# Patient Record
Sex: FEMALE | Race: WHITE | NOT HISPANIC OR LATINO
[De-identification: names, ages, dates, MRNs, and addresses within clinical notes are randomized per-mention and may not be internally consistent; named-entity substitution may affect disease eponyms.]

---

## 2017-02-01 ENCOUNTER — OTHER (OUTPATIENT)
Age: 67
End: 2017-02-01

## 2017-02-01 ENCOUNTER — APPOINTMENT (OUTPATIENT)
Dept: ORTHOPEDIC SURGERY | Facility: CLINIC | Age: 67
End: 2017-02-01

## 2017-02-01 VITALS
SYSTOLIC BLOOD PRESSURE: 148 MMHG | BODY MASS INDEX: 25.07 KG/M2 | HEART RATE: 86 BPM | DIASTOLIC BLOOD PRESSURE: 83 MMHG | TEMPERATURE: 98.3 F | HEIGHT: 66 IN | WEIGHT: 156 LBS

## 2017-02-01 DIAGNOSIS — S82.044D NONDISPLACED COMMINUTED FRACTURE OF RIGHT PATELLA, SUBSEQUENT ENCOUNTER FOR CLOSED FRACTURE WITH ROUTINE HEALING: ICD-10-CM

## 2017-02-16 ENCOUNTER — APPOINTMENT (OUTPATIENT)
Dept: PULMONOLOGY | Facility: CLINIC | Age: 67
End: 2017-02-16

## 2017-04-05 ENCOUNTER — APPOINTMENT (OUTPATIENT)
Dept: PULMONOLOGY | Facility: CLINIC | Age: 67
End: 2017-04-05

## 2017-04-05 VITALS
HEART RATE: 84 BPM | OXYGEN SATURATION: 98 % | SYSTOLIC BLOOD PRESSURE: 138 MMHG | RESPIRATION RATE: 16 BRPM | DIASTOLIC BLOOD PRESSURE: 80 MMHG

## 2017-04-05 DIAGNOSIS — R59.0 LOCALIZED ENLARGED LYMPH NODES: ICD-10-CM

## 2017-04-27 ENCOUNTER — MESSAGE (OUTPATIENT)
Age: 67
End: 2017-04-27

## 2017-05-10 ENCOUNTER — MESSAGE (OUTPATIENT)
Age: 67
End: 2017-05-10

## 2017-05-15 ENCOUNTER — APPOINTMENT (OUTPATIENT)
Dept: THORACIC SURGERY | Facility: CLINIC | Age: 67
End: 2017-05-15

## 2017-09-07 ENCOUNTER — APPOINTMENT (OUTPATIENT)
Dept: PULMONOLOGY | Facility: CLINIC | Age: 67
End: 2017-09-07

## 2017-09-29 ENCOUNTER — MESSAGE (OUTPATIENT)
Age: 67
End: 2017-09-29

## 2017-11-20 ENCOUNTER — APPOINTMENT (OUTPATIENT)
Dept: PULMONOLOGY | Facility: CLINIC | Age: 67
End: 2017-11-20
Payer: MEDICARE

## 2017-11-20 VITALS
HEART RATE: 73 BPM | DIASTOLIC BLOOD PRESSURE: 82 MMHG | BODY MASS INDEX: 25.5 KG/M2 | WEIGHT: 158 LBS | OXYGEN SATURATION: 99 % | SYSTOLIC BLOOD PRESSURE: 136 MMHG

## 2017-11-20 PROCEDURE — 99214 OFFICE O/P EST MOD 30 MIN: CPT

## 2017-11-20 RX ORDER — SILVER SULFADIAZINE 10 MG/G
1 CREAM TOPICAL
Qty: 85 | Refills: 0 | Status: DISCONTINUED | COMMUNITY
Start: 2017-09-08 | End: 2017-11-20

## 2017-11-20 RX ORDER — METHYLPREDNISOLONE 4 MG/1
4 TABLET ORAL
Qty: 180 | Refills: 0 | Status: DISCONTINUED | COMMUNITY
Start: 2017-10-30 | End: 2017-11-20

## 2017-11-20 RX ORDER — DOXYCYCLINE 100 MG/1
100 TABLET, FILM COATED ORAL
Qty: 20 | Refills: 0 | Status: DISCONTINUED | COMMUNITY
Start: 2017-09-07 | End: 2017-11-20

## 2018-05-15 ENCOUNTER — APPOINTMENT (OUTPATIENT)
Dept: PULMONOLOGY | Facility: CLINIC | Age: 68
End: 2018-05-15

## 2018-06-27 ENCOUNTER — MESSAGE (OUTPATIENT)
Age: 68
End: 2018-06-27

## 2018-07-02 ENCOUNTER — APPOINTMENT (OUTPATIENT)
Dept: THORACIC SURGERY | Facility: CLINIC | Age: 68
End: 2018-07-02
Payer: MEDICARE

## 2018-07-02 VITALS
HEIGHT: 66 IN | OXYGEN SATURATION: 98 % | RESPIRATION RATE: 16 BRPM | BODY MASS INDEX: 23.95 KG/M2 | WEIGHT: 149 LBS | DIASTOLIC BLOOD PRESSURE: 78 MMHG | HEART RATE: 72 BPM | SYSTOLIC BLOOD PRESSURE: 147 MMHG

## 2018-07-02 PROCEDURE — 99214 OFFICE O/P EST MOD 30 MIN: CPT

## 2018-07-09 ENCOUNTER — APPOINTMENT (OUTPATIENT)
Dept: PULMONOLOGY | Facility: CLINIC | Age: 68
End: 2018-07-09
Payer: MEDICARE

## 2018-07-09 VITALS
OXYGEN SATURATION: 96 % | DIASTOLIC BLOOD PRESSURE: 60 MMHG | WEIGHT: 149 LBS | BODY MASS INDEX: 23.95 KG/M2 | HEIGHT: 66 IN | HEART RATE: 74 BPM | SYSTOLIC BLOOD PRESSURE: 130 MMHG

## 2018-07-09 DIAGNOSIS — R06.09 OTHER FORMS OF DYSPNEA: ICD-10-CM

## 2018-07-09 PROCEDURE — 94727 GAS DIL/WSHOT DETER LNG VOL: CPT

## 2018-07-09 PROCEDURE — 99215 OFFICE O/P EST HI 40 MIN: CPT | Mod: 25

## 2018-07-09 PROCEDURE — 94729 DIFFUSING CAPACITY: CPT

## 2018-07-09 PROCEDURE — 85018 HEMOGLOBIN: CPT | Mod: QW

## 2018-07-09 PROCEDURE — 94010 BREATHING CAPACITY TEST: CPT

## 2018-08-20 ENCOUNTER — APPOINTMENT (OUTPATIENT)
Dept: PULMONOLOGY | Facility: CLINIC | Age: 68
End: 2018-08-20

## 2018-08-28 ENCOUNTER — APPOINTMENT (OUTPATIENT)
Dept: PULMONOLOGY | Facility: CLINIC | Age: 68
End: 2018-08-28

## 2018-08-31 ENCOUNTER — APPOINTMENT (OUTPATIENT)
Dept: PULMONOLOGY | Facility: CLINIC | Age: 68
End: 2018-08-31
Payer: MEDICARE

## 2018-08-31 VITALS
SYSTOLIC BLOOD PRESSURE: 122 MMHG | DIASTOLIC BLOOD PRESSURE: 80 MMHG | HEART RATE: 79 BPM | BODY MASS INDEX: 23.89 KG/M2 | OXYGEN SATURATION: 96 % | WEIGHT: 148 LBS | RESPIRATION RATE: 16 BRPM

## 2018-08-31 PROCEDURE — 99214 OFFICE O/P EST MOD 30 MIN: CPT

## 2018-09-21 ENCOUNTER — APPOINTMENT (OUTPATIENT)
Dept: NEUROLOGY | Facility: CLINIC | Age: 68
End: 2018-09-21

## 2018-10-02 ENCOUNTER — MEDICATION RENEWAL (OUTPATIENT)
Age: 68
End: 2018-10-02

## 2018-10-02 ENCOUNTER — MESSAGE (OUTPATIENT)
Age: 68
End: 2018-10-02

## 2018-10-09 ENCOUNTER — APPOINTMENT (OUTPATIENT)
Dept: PULMONOLOGY | Facility: CLINIC | Age: 68
End: 2018-10-09

## 2018-10-23 ENCOUNTER — TRANSCRIPTION ENCOUNTER (OUTPATIENT)
Age: 68
End: 2018-10-23

## 2018-10-24 ENCOUNTER — OUTPATIENT (OUTPATIENT)
Dept: OUTPATIENT SERVICES | Facility: HOSPITAL | Age: 68
LOS: 1 days | End: 2018-10-24
Payer: MEDICARE

## 2018-10-24 DIAGNOSIS — M54.16 RADICULOPATHY, LUMBAR REGION: ICD-10-CM

## 2018-10-24 PROCEDURE — 77003 FLUOROGUIDE FOR SPINE INJECT: CPT

## 2018-10-24 PROCEDURE — 62323 NJX INTERLAMINAR LMBR/SAC: CPT

## 2018-11-28 ENCOUNTER — APPOINTMENT (OUTPATIENT)
Dept: PULMONOLOGY | Facility: CLINIC | Age: 68
End: 2018-11-28

## 2018-12-17 ENCOUNTER — FORM ENCOUNTER (OUTPATIENT)
Age: 68
End: 2018-12-17

## 2018-12-18 ENCOUNTER — APPOINTMENT (OUTPATIENT)
Dept: CT IMAGING | Facility: CLINIC | Age: 68
End: 2018-12-18
Payer: MEDICARE

## 2018-12-18 ENCOUNTER — OUTPATIENT (OUTPATIENT)
Dept: OUTPATIENT SERVICES | Facility: HOSPITAL | Age: 68
LOS: 1 days | End: 2018-12-18
Payer: MEDICARE

## 2018-12-18 ENCOUNTER — TRANSCRIPTION ENCOUNTER (OUTPATIENT)
Age: 68
End: 2018-12-18

## 2018-12-18 DIAGNOSIS — R91.8 OTHER NONSPECIFIC ABNORMAL FINDING OF LUNG FIELD: ICD-10-CM

## 2018-12-18 PROCEDURE — 71250 CT THORAX DX C-: CPT

## 2018-12-18 PROCEDURE — 71250 CT THORAX DX C-: CPT | Mod: 26

## 2018-12-19 ENCOUNTER — OUTPATIENT (OUTPATIENT)
Dept: OUTPATIENT SERVICES | Facility: HOSPITAL | Age: 68
LOS: 1 days | End: 2018-12-19
Payer: MEDICARE

## 2018-12-19 DIAGNOSIS — M54.16 RADICULOPATHY, LUMBAR REGION: ICD-10-CM

## 2018-12-19 PROCEDURE — 62323 NJX INTERLAMINAR LMBR/SAC: CPT

## 2018-12-19 PROCEDURE — 77003 FLUOROGUIDE FOR SPINE INJECT: CPT

## 2018-12-31 ENCOUNTER — APPOINTMENT (OUTPATIENT)
Dept: THORACIC SURGERY | Facility: CLINIC | Age: 68
End: 2018-12-31

## 2019-01-10 ENCOUNTER — APPOINTMENT (OUTPATIENT)
Dept: PULMONOLOGY | Facility: CLINIC | Age: 69
End: 2019-01-10
Payer: MEDICARE

## 2019-01-10 VITALS — SYSTOLIC BLOOD PRESSURE: 124 MMHG | DIASTOLIC BLOOD PRESSURE: 80 MMHG

## 2019-01-10 VITALS — HEART RATE: 78 BPM | OXYGEN SATURATION: 98 % | BODY MASS INDEX: 22.27 KG/M2 | WEIGHT: 138 LBS

## 2019-01-10 PROCEDURE — 99214 OFFICE O/P EST MOD 30 MIN: CPT

## 2019-01-10 RX ORDER — HYDROCODONE BITARTRATE AND ACETAMINOPHEN 10; 325 MG/1; MG/1
10-325 TABLET ORAL
Qty: 60 | Refills: 0 | Status: DISCONTINUED | COMMUNITY
Start: 2017-10-16 | End: 2019-01-10

## 2019-01-10 NOTE — REASON FOR VISIT
[Follow-Up] : a follow-up visit [Abnormal CT Scan] : abnormal CT Scan [Abnormal Chest X-Ray] : abnormal Chest X-Ray [Shortness of Breath] : shortness of Breath [FreeTextEntry2] : multiple nodules

## 2019-01-10 NOTE — PHYSICAL EXAM
[General Appearance - Well Developed] : well developed [Normal Appearance] : normal appearance [General Appearance - Well Nourished] : well nourished [No Deformities] : no deformities [Neck Appearance] : the appearance of the neck was normal [Heart Rate And Rhythm] : heart rate and rhythm were normal [Heart Sounds] : normal S1 and S2 [Murmurs] : no murmurs present [Edema] : no peripheral edema present [Respiration, Rhythm And Depth] : normal respiratory rhythm and effort [Exaggerated Use Of Accessory Muscles For Inspiration] : no accessory muscle use [Lungs Percussion] : the lungs were normal to percussion [Abnormal Walk] : normal gait [] : no rash [No Focal Deficits] : no focal deficits [Oriented To Time, Place, And Person] : oriented to person, place, and time [Impaired Insight] : insight and judgment were intact [Affect] : the affect was normal [Memory Recent] : recent memory was not impaired [Nail Clubbing] : no clubbing of the fingernails [Cyanosis, Localized] : no localized cyanosis [FreeTextEntry1] : no chest wall abn

## 2019-01-10 NOTE — REVIEW OF SYSTEMS
[Nasal Congestion] : nasal congestion [As Noted in HPI] : as noted in HPI [Nasal Discharge] : nasal discharge [Anxiety] : anxiety [Negative] : Genitourinary/GYN

## 2019-01-10 NOTE — PROCEDURE
[FreeTextEntry1] : pFts: mild restriction, slight worsening decrease in  DLCO\par CT scan 12/18: bronchiectasis, mild interstitial thickening bases, sight increase solid component RUL and RLL , both increased in size

## 2019-01-10 NOTE — HISTORY OF PRESENT ILLNESS
[FreeTextEntry1] : cough and sputum\par smoke free\par denies any sig dyspnea\par  no fever, chills, chest pain\par has prn ventolin, feels no difference\par for FNA lung, Dr Martínez\par post RT L breast\par

## 2019-01-10 NOTE — CONSULT LETTER
[Dear  ___] : Dear  [unfilled], [Consult Letter:] : I had the pleasure of evaluating your patient, [unfilled]. [Please see my note below.] : Please see my note below. [Consult Closing:] : Thank you very much for allowing me to participate in the care of this patient.  If you have any questions, please do not hesitate to contact me. [Sincerely,] : Sincerely, [DrMarilynn ___] : Dr. PATEL [[facility] Ross[facility] DO, [facility]] : Bandar Rossi DO, [facility] [Director, Respiratory Care] : Director, Respiratory Care [[facility]] : [facility] [DrMarilynn  ___] : Dr. PATEL [FreeTextEntry3] : [facility] DO [facility]\par Pulmonary Critical Care\par Director Pulmonary Division\par Medical Director Respiratory Therapy\par [facility]\par \par

## 2019-01-10 NOTE — DISCUSSION/SUMMARY
[FreeTextEntry1] : Mild restrictive physiologic impairment, postthoracotomy for rheumatoid pleural disease, severely decreased DLCO, mils scarring bases\par Chronic small Eff, ground glass nodules/solid , increasing on CT  scan 12/18, FNA planned with [employer] post saw Dr Martínez [facility]\par complicated case with 2 slow going lesions and sig risk of thoracotomy, breast cancer post RT\par Underlying rheumatoid arthritis/bronchiectasis with mild interstitial scarring \par at baseline, no pulmonary complaints\par exam without bronchospasm, decreased R base,normal spo2\par  3 months or sooner if needed, further input pending bx \par vaccinations this fall\par No Pulmonary contra indications to proposed procedure\par increased risk of post operative pulmonary complications \par risk/ benefit favors\par albuteral neb q 6 hrs prn\par incentive spirometry and DVT prophylaxis\par \par \par \par

## 2019-04-11 ENCOUNTER — APPOINTMENT (OUTPATIENT)
Dept: PULMONOLOGY | Facility: CLINIC | Age: 69
End: 2019-04-11
Payer: MEDICARE

## 2019-04-11 VITALS — HEART RATE: 65 BPM | OXYGEN SATURATION: 98 % | SYSTOLIC BLOOD PRESSURE: 110 MMHG | DIASTOLIC BLOOD PRESSURE: 60 MMHG

## 2019-04-11 PROCEDURE — 99214 OFFICE O/P EST MOD 30 MIN: CPT

## 2019-04-11 RX ORDER — OXYCODONE HYDROCHLORIDE AND ACETAMINOPHEN 10; 325 MG/1; MG/1
10-325 TABLET ORAL
Refills: 0 | Status: ACTIVE | COMMUNITY

## 2019-04-11 NOTE — REASON FOR VISIT
[Follow-Up] : a follow-up visit [Abnormal CT Scan] : abnormal CT Scan [Shortness of Breath] : shortness of Breath [Abnormal Chest X-Ray] : abnormal Chest X-Ray [Lung Cancer] : cancer

## 2019-04-11 NOTE — CONSULT LETTER
[Dear  ___] : Dear  [unfilled], [Consult Letter:] : I had the pleasure of evaluating your patient, [unfilled]. [Please see my note below.] : Please see my note below. [Consult Closing:] : Thank you very much for allowing me to participate in the care of this patient.  If you have any questions, please do not hesitate to contact me. [Sincerely,] : Sincerely, [DrMarilynn  ___] : Dr. PATEL [[facility], [facility]] : [facility], [facility] [Director, Respiratory Care] : Director, Respiratory Care [[facility]] : [facility] [FreeTextEntry3] : [facility]\par Pulmonary Critical Care\par Director Pulmonary Division\par Medical Director Respiratory Therapy\par [facility]\par \par  [DrMarilynn ___] : Dr. PATEL [___] : [unfilled]

## 2019-04-11 NOTE — HISTORY OF PRESENT ILLNESS
[FreeTextEntry1] : \par smoke free\par denies any sig dyspnea\par  no fever, chills, chest pain\par has prn ventolin, feels no difference\par post RT for adenocarcinoma, R upper and lower lung zones 3/19 Dr Vázquez\par [facility] Dr Salgado\par \par

## 2019-04-11 NOTE — DISCUSSION/SUMMARY
[FreeTextEntry1] : Mild restrictive physiologic impairment, postthoracotomy for rheumatoid pleural disease, severely decreased DLCO, mild scarring bases, synchronous adenocarcinoma stage 1 \par  post RT R upper and lower  lung zones Dr Vázquez, not surgical candidate saw Dr Martínez\par Underlying rheumatoid arthritis/bronchiectasis with mild interstitial scarring \par at baseline, no pulmonary complaints\par  exam without bronchospasm, crackles R base,normal spo2\par PET scan, further oncological input pending\par no new Pulmonary complaints\par 5 months with PFTs or sooner if needed\par \par \par \par \par

## 2019-04-11 NOTE — PROCEDURE
[FreeTextEntry1] : synchronous lung cancers adenocarcinoma stage 1\par pathology , radiation oncology and oncology notes reviewed\par

## 2019-04-11 NOTE — PHYSICAL EXAM
[General Appearance - Well Developed] : well developed [Normal Appearance] : normal appearance [General Appearance - Well Nourished] : well nourished [No Deformities] : no deformities [Neck Appearance] : the appearance of the neck was normal [Heart Rate And Rhythm] : heart rate and rhythm were normal [Heart Sounds] : normal S1 and S2 [Murmurs] : no murmurs present [Respiration, Rhythm And Depth] : normal respiratory rhythm and effort [Edema] : no peripheral edema present [Exaggerated Use Of Accessory Muscles For Inspiration] : no accessory muscle use [Lungs Percussion] : the lungs were normal to percussion [Abnormal Walk] : normal gait [] : no rash [No Focal Deficits] : no focal deficits [Oriented To Time, Place, And Person] : oriented to person, place, and time [Impaired Insight] : insight and judgment were intact [Affect] : the affect was normal [Memory Recent] : recent memory was not impaired [Nail Clubbing] : no clubbing of the fingernails [Cyanosis, Localized] : no localized cyanosis [FreeTextEntry1] : no chest wall abn

## 2019-09-12 ENCOUNTER — APPOINTMENT (OUTPATIENT)
Dept: PULMONOLOGY | Facility: CLINIC | Age: 69
End: 2019-09-12
Payer: MEDICARE

## 2019-09-12 VITALS — OXYGEN SATURATION: 95 % | HEART RATE: 58 BPM | SYSTOLIC BLOOD PRESSURE: 125 MMHG | DIASTOLIC BLOOD PRESSURE: 70 MMHG

## 2019-09-12 VITALS — BODY MASS INDEX: 22.27 KG/M2 | WEIGHT: 138 LBS

## 2019-09-12 DIAGNOSIS — J84.9 INTERSTITIAL PULMONARY DISEASE, UNSPECIFIED: ICD-10-CM

## 2019-09-12 DIAGNOSIS — R91.1 SOLITARY PULMONARY NODULE: ICD-10-CM

## 2019-09-12 PROCEDURE — 99215 OFFICE O/P EST HI 40 MIN: CPT

## 2019-09-12 RX ORDER — CLOPIDOGREL 75 MG/1
TABLET, FILM COATED ORAL
Refills: 0 | Status: ACTIVE | COMMUNITY

## 2019-09-12 NOTE — HISTORY OF PRESENT ILLNESS
[FreeTextEntry1] : cough and sputum\par smoke free\par denies any sig dyspnea\par  no fever, chills, chest pain\par has prn ventolin, feels no difference\par post RT r lung, repeat CT chest planned\par \par

## 2019-09-12 NOTE — DISCUSSION/SUMMARY
[FreeTextEntry1] :  restrictive physiologic impairment, postthoracotomy for rheumatoid pleural disease, severely decreased DLCO, mils scarring bases\par  Newly dx :non small cell lung cancer felt to be 2 separate primaries\par  RUL,RLL both adenocarcinoma post RT, PET 6/19 noted with slight incr RUL nodule\par Underlying rheumatoid arthritis/bronchiectasis with mild interstitial scarring , chronic eff, decortication on R\par at baseline, no pulmonary complaints, does not want spirometry today\par exam without bronchospasm, decreased R base,normal spo2\par  3 months or sooner if needed, further input pending repeat CT scan per oncology\par vaccinations this fall\par \par \par \par \par

## 2019-09-12 NOTE — PHYSICAL EXAM
[General Appearance - Well Developed] : well developed [Normal Appearance] : normal appearance [General Appearance - Well Nourished] : well nourished [No Deformities] : no deformities [Neck Appearance] : the appearance of the neck was normal [Heart Rate And Rhythm] : heart rate and rhythm were normal [Heart Sounds] : normal S1 and S2 [Murmurs] : no murmurs present [Edema] : no peripheral edema present [Exaggerated Use Of Accessory Muscles For Inspiration] : no accessory muscle use [Respiration, Rhythm And Depth] : normal respiratory rhythm and effort [Lungs Percussion] : the lungs were normal to percussion [Abnormal Walk] : normal gait [] : no rash [Oriented To Time, Place, And Person] : oriented to person, place, and time [No Focal Deficits] : no focal deficits [Affect] : the affect was normal [Impaired Insight] : insight and judgment were intact [Memory Recent] : recent memory was not impaired [Nail Clubbing] : no clubbing of the fingernails [Cyanosis, Localized] : no localized cyanosis [FreeTextEntry1] : no chest wall abn

## 2019-12-09 ENCOUNTER — APPOINTMENT (OUTPATIENT)
Dept: PULMONOLOGY | Facility: CLINIC | Age: 69
End: 2019-12-09
Payer: MEDICARE

## 2019-12-09 VITALS
SYSTOLIC BLOOD PRESSURE: 118 MMHG | RESPIRATION RATE: 14 BRPM | DIASTOLIC BLOOD PRESSURE: 78 MMHG | OXYGEN SATURATION: 95 % | HEART RATE: 73 BPM

## 2019-12-09 VITALS — BODY MASS INDEX: 23.9 KG/M2 | WEIGHT: 140 LBS | HEIGHT: 64 IN

## 2019-12-09 PROCEDURE — 94664 DEMO&/EVAL PT USE INHALER: CPT | Mod: 59

## 2019-12-09 PROCEDURE — 94060 EVALUATION OF WHEEZING: CPT

## 2019-12-09 PROCEDURE — 99214 OFFICE O/P EST MOD 30 MIN: CPT | Mod: 25

## 2019-12-09 NOTE — DISCUSSION/SUMMARY
[FreeTextEntry1] :  restrictive physiologic impairment, postthoracotomy for rheumatoid pleural disease, severely decreased DLCO, mild  scarring bases\par  Newly dx :non small cell lung cancer felt to be 2 separate primaries\par  RUL,RLL both adenocarcinoma post RT, PET 6/19 noted with slight incr RUL nodule\par Underlying rheumatoid arthritis/bronchiectasis with mild interstitial scarring , chronic eff, decortication on R\par at baseline, spirometry with moderate air flow obstruction , slight decreased flows\par had exertional chest discomfort last few weeks, Cardiology called, they will see her today\par exam without bronchospasm, normal sp02, prn abx\par  3 months or sooner if needed, further input pending repeat CT scan per oncology\par vaccinations this fall\par \par \par \par \par

## 2019-12-09 NOTE — PHYSICAL EXAM
[General Appearance - Well Developed] : well developed [Normal Appearance] : normal appearance [General Appearance - Well Nourished] : well nourished [No Deformities] : no deformities [Neck Appearance] : the appearance of the neck was normal [Heart Rate And Rhythm] : heart rate and rhythm were normal [Heart Sounds] : normal S1 and S2 [Edema] : no peripheral edema present [Murmurs] : no murmurs present [Respiration, Rhythm And Depth] : normal respiratory rhythm and effort [Exaggerated Use Of Accessory Muscles For Inspiration] : no accessory muscle use [Lungs Percussion] : the lungs were normal to percussion [FreeTextEntry1] : no chest wall abn [Abnormal Walk] : normal gait [] : no rash [No Focal Deficits] : no focal deficits [Oriented To Time, Place, And Person] : oriented to person, place, and time [Impaired Insight] : insight and judgment were intact [Affect] : the affect was normal [Memory Recent] : recent memory was not impaired [Nail Clubbing] : no clubbing of the fingernails [Cyanosis, Localized] : no localized cyanosis

## 2019-12-09 NOTE — REASON FOR VISIT
[Follow-Up] : a follow-up visit [Abnormal Chest X-Ray] : abnormal Chest X-Ray [Abnormal CT Scan] : abnormal CT Scan [FreeTextEntry2] : multiple nodules [Shortness of Breath] : shortness of Breath

## 2019-12-09 NOTE — CONSULT LETTER
[Dear  ___] : Dear  [unfilled], [Consult Letter:] : I had the pleasure of evaluating your patient, [unfilled]. [Consult Closing:] : Thank you very much for allowing me to participate in the care of this patient.  If you have any questions, please do not hesitate to contact me. [Please see my note below.] : Please see my note below. [Sincerely,] : Sincerely, [FreeTextEntry3] : [facility] DO [facility]\par Pulmonary Critical Care\par Director Pulmonary Division\par Medical Director Respiratory Therapy\par [facility]\par \par  [DrMarilynn  ___] : Dr. PATEL [DrMarilynn ___] : Dr. PATEL [[facility], [facility]] : Band[facility] Ross[facility] DO, [facility] [Director, Respiratory Care] : Director, Respiratory Care [[facility]] : [facility]

## 2020-04-16 ENCOUNTER — APPOINTMENT (OUTPATIENT)
Dept: PULMONOLOGY | Facility: CLINIC | Age: 70
End: 2020-04-16

## 2020-09-22 ENCOUNTER — APPOINTMENT (OUTPATIENT)
Dept: PULMONOLOGY | Facility: CLINIC | Age: 70
End: 2020-09-22
Payer: MEDICARE

## 2020-09-22 VITALS
HEART RATE: 63 BPM | OXYGEN SATURATION: 99 % | WEIGHT: 140 LBS | SYSTOLIC BLOOD PRESSURE: 150 MMHG | BODY MASS INDEX: 24.03 KG/M2 | DIASTOLIC BLOOD PRESSURE: 66 MMHG

## 2020-09-22 DIAGNOSIS — J47.9 BRONCHIECTASIS, UNCOMPLICATED: ICD-10-CM

## 2020-09-22 DIAGNOSIS — Z03.89 ENCOUNTER FOR OBSERVATION FOR OTHER SUSPECTED DISEASES AND CONDITIONS RULED OUT: ICD-10-CM

## 2020-09-22 PROCEDURE — 99215 OFFICE O/P EST HI 40 MIN: CPT

## 2020-09-22 RX ORDER — FLUTICASONE FUROATE AND VILANTEROL TRIFENATATE 100; 25 UG/1; UG/1
100-25 POWDER RESPIRATORY (INHALATION)
Qty: 1 | Refills: 3 | Status: DISCONTINUED | COMMUNITY
Start: 2019-12-09 | End: 2020-09-22

## 2020-09-22 RX ORDER — ANASTROZOLE TABLETS 1 MG/1
1 TABLET ORAL
Refills: 0 | Status: DISCONTINUED | COMMUNITY
Start: 2019-01-10 | End: 2020-09-22

## 2020-09-22 RX ORDER — ALBUTEROL SULFATE 90 UG/1
108 (90 BASE) AEROSOL, METERED RESPIRATORY (INHALATION)
Qty: 1 | Refills: 3 | Status: DISCONTINUED | COMMUNITY
Start: 2019-09-12 | End: 2020-09-22

## 2020-09-22 RX ORDER — LETROZOLE 2.5 MG/1
2.5 TABLET, FILM COATED ORAL
Refills: 0 | Status: ACTIVE | COMMUNITY

## 2020-09-22 RX ORDER — ANASTROZOLE 1 MG/1
TABLET ORAL
Refills: 0 | Status: DISCONTINUED | COMMUNITY
End: 2020-09-22

## 2020-09-22 NOTE — DISCUSSION/SUMMARY
[FreeTextEntry1] : Restrictive physiologic impairment, postthoracotomy for rheumatoid pleural disease, severely decreased DLCO, mild  scarring bases,RA on MTX\par Non small cell lung cancer felt to be 2 separate primaries\par  RUL,RLL both adenocarcinoma post RT, CT scan 8/20  noted with incr RUL density\par Had subsequent FNA  x 3 - non diagnostic, repeat scanning planned, has Oncology follow up Dr Rascon\par at baseline,last spirometry with moderate air flow obstruction\par no acute infectious complaints\par exam without bronchospasm, normal sp02, prn abx, prn rescue\par Needs repeat CT or CT/PET 3 months, re evaluate post, no hx TB exposure\par vaccinations this fall\par follow up 3 months or sooner if needed\par \par \par \par \par

## 2020-09-22 NOTE — HISTORY OF PRESENT ILLNESS
[FreeTextEntry1] : at baseline\par has rhinitis\par no sig sputum\par smoke free\par denies any sig dyspnea\par  no fever, chills, chest pain\par has prn ventolin\par post RT r lung, non dx FNA , repeat CT chest planned\par \par

## 2020-09-22 NOTE — CONSULT LETTER
[Dear  ___] : Dear  [unfilled], [Consult Letter:] : I had the pleasure of evaluating your patient, [unfilled]. [Please see my note below.] : Please see my note below. [Consult Closing:] : Thank you very much for allowing me to participate in the care of this patient.  If you have any questions, please do not hesitate to contact me. [Sincerely,] : Sincerely, [DrMarilynn  ___] : Dr. PATEL [[facility] DO, [facility]] : [facility] Ross[facility] DO, [facility] [Director, Respiratory Care] : Director, Respiratory Care [[facility]] : [facility] [FreeTextEntry3] : [facility]\par Pulmonary Critical Care\par Director Pulmonary Division\par Medical Director Respiratory Therapy\par [facility]\par \par  [DrMarilynn ___] : Dr. PATEL

## 2020-09-22 NOTE — REASON FOR VISIT
[Follow-Up] : a follow-up visit [Abnormal CT Scan] : abnormal CT Scan [Shortness of Breath] : shortness of Breath [Lung Cancer] : cancer [FreeTextEntry2] : multiple nodules

## 2020-09-30 ENCOUNTER — APPOINTMENT (OUTPATIENT)
Dept: PULMONOLOGY | Facility: CLINIC | Age: 70
End: 2020-09-30
Payer: MEDICARE

## 2020-09-30 ENCOUNTER — APPOINTMENT (OUTPATIENT)
Dept: PULMONOLOGY | Facility: CLINIC | Age: 70
End: 2020-09-30

## 2020-09-30 DIAGNOSIS — Z23 ENCOUNTER FOR IMMUNIZATION: ICD-10-CM

## 2020-09-30 PROCEDURE — G0008: CPT

## 2020-09-30 PROCEDURE — 90662 IIV NO PRSV INCREASED AG IM: CPT

## 2021-11-12 DIAGNOSIS — Z01.818 ENCOUNTER FOR OTHER PREPROCEDURAL EXAMINATION: ICD-10-CM

## 2021-11-19 ENCOUNTER — APPOINTMENT (OUTPATIENT)
Dept: DISASTER EMERGENCY | Facility: CLINIC | Age: 71
End: 2021-11-19

## 2021-11-20 LAB — SARS-COV-2 N GENE NPH QL NAA+PROBE: NOT DETECTED

## 2021-12-03 ENCOUNTER — APPOINTMENT (OUTPATIENT)
Dept: PULMONOLOGY | Facility: CLINIC | Age: 71
End: 2021-12-03

## 2021-12-06 ENCOUNTER — APPOINTMENT (OUTPATIENT)
Dept: PULMONOLOGY | Facility: CLINIC | Age: 71
End: 2021-12-06
Payer: MEDICARE

## 2021-12-06 VITALS — HEIGHT: 64.5 IN | BODY MASS INDEX: 22.77 KG/M2 | WEIGHT: 135 LBS

## 2021-12-06 VITALS
SYSTOLIC BLOOD PRESSURE: 130 MMHG | RESPIRATION RATE: 16 BRPM | HEART RATE: 72 BPM | OXYGEN SATURATION: 95 % | DIASTOLIC BLOOD PRESSURE: 60 MMHG

## 2021-12-06 DIAGNOSIS — Z01.811 ENCOUNTER FOR PREPROCEDURAL RESPIRATORY EXAMINATION: ICD-10-CM

## 2021-12-06 DIAGNOSIS — J94.9 PLEURAL CONDITION, UNSPECIFIED: ICD-10-CM

## 2021-12-06 PROCEDURE — 94727 GAS DIL/WSHOT DETER LNG VOL: CPT

## 2021-12-06 PROCEDURE — 94729 DIFFUSING CAPACITY: CPT

## 2021-12-06 PROCEDURE — 94010 BREATHING CAPACITY TEST: CPT

## 2021-12-06 PROCEDURE — 99214 OFFICE O/P EST MOD 30 MIN: CPT | Mod: 25

## 2021-12-06 PROCEDURE — 85018 HEMOGLOBIN: CPT | Mod: QW

## 2021-12-06 RX ORDER — DENOSUMAB 60 MG/ML
60 INJECTION SUBCUTANEOUS
Qty: 1 | Refills: 0 | Status: ACTIVE | COMMUNITY
Start: 2021-07-30

## 2021-12-06 RX ORDER — LISINOPRIL 40 MG/1
40 TABLET ORAL
Qty: 90 | Refills: 0 | Status: ACTIVE | COMMUNITY
Start: 2020-12-23

## 2021-12-06 RX ORDER — METOPROLOL SUCCINATE 50 MG/1
50 TABLET, EXTENDED RELEASE ORAL
Qty: 90 | Refills: 0 | Status: ACTIVE | COMMUNITY
Start: 2021-04-08

## 2021-12-06 RX ORDER — FOLIC ACID 1 MG/1
1 TABLET ORAL
Qty: 90 | Refills: 0 | Status: DISCONTINUED | COMMUNITY
Start: 2017-10-02 | End: 2021-12-06

## 2021-12-06 RX ORDER — ABATACEPT 250 MG/15ML
250 INJECTION, POWDER, LYOPHILIZED, FOR SOLUTION INTRAVENOUS
Qty: 3 | Refills: 0 | Status: ACTIVE | COMMUNITY
Start: 2021-07-21

## 2021-12-06 RX ORDER — METHOTREXATE 2.5 MG/1
2.5 TABLET ORAL
Refills: 0 | Status: DISCONTINUED | COMMUNITY
Start: 2017-10-02 | End: 2021-12-06

## 2021-12-06 RX ORDER — NIFEDIPINE 60 MG/1
60 TABLET, EXTENDED RELEASE ORAL
Qty: 5 | Refills: 0 | Status: DISCONTINUED | COMMUNITY
Start: 2021-09-01 | End: 2021-12-06

## 2021-12-06 RX ORDER — VENLAFAXINE 75 MG/1
75 TABLET ORAL
Qty: 20 | Refills: 0 | Status: ACTIVE | COMMUNITY
Start: 2021-11-11

## 2021-12-06 NOTE — HISTORY OF PRESENT ILLNESS
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 She developed to separate lung cancers which were treated with radiation and chemotherapy. She has a history of breast cancer as well. She is going for epidural injections for back pain. Pulmonary clearance was requested. The patient denies any increase in shortness of breath over baseline. Denies cough or wheeze.

## 2021-12-06 NOTE — PHYSICAL EXAM
[General Appearance - Well Developed] : well developed [Normal Appearance] : normal appearance [General Appearance - Well Nourished] : well nourished [No Deformities] : no deformities [Neck Appearance] : the appearance of the neck was normal [Heart Rate And Rhythm] : heart rate and rhythm were normal [Heart Sounds] : normal S1 and S2 [Murmurs] : no murmurs present [Edema] : no peripheral edema present [Respiration, Rhythm And Depth] : normal respiratory rhythm and effort [Exaggerated Use Of Accessory Muscles For Inspiration] : no accessory muscle use [Lungs Percussion] : the lungs were normal to percussion [FreeTextEntry1] : no chest wall abn [Abnormal Walk] : normal gait [] : no rash [No Focal Deficits] : no focal deficits [Oriented To Time, Place, And Person] : oriented to person, place, and time [Impaired Insight] : insight and judgment were intact [Affect] : the affect was normal [Memory Recent] : recent memory was not impaired [Nail Clubbing] : no clubbing of the fingernails [Cyanosis, Localized] : no localized cyanosis

## 2021-12-06 NOTE — PROCEDURE
[FreeTextEntry1] : Pulmonary function studies demonstrate moderate restriction similar to what has been seen in the past. Diffusing defect similar to what has been seen in the past.

## 2021-12-06 NOTE — CONSULT LETTER
[Dear  ___] : Dear  [unfilled], [Courtesy Letter:] : I had the pleasure of seeing your patient, [unfilled], in my office today. [Please see my note below.] : Please see my note below. [Consult Closing:] : Thank you very much for allowing me to participate in the care of this patient.  If you have any questions, please do not hesitate to contact me. [Sincerely,] : Sincerely, [FreeTextEntry3] : Usha Celaya MD FCCP\par D-ABSM\par ABIM board certified in  Pulmonary diseases, Sleep medicine\par Internal medicine\par

## 2022-06-15 ENCOUNTER — APPOINTMENT (OUTPATIENT)
Dept: PULMONOLOGY | Facility: CLINIC | Age: 72
End: 2022-06-15
Payer: MEDICARE

## 2022-06-15 VITALS
RESPIRATION RATE: 16 BRPM | HEART RATE: 72 BPM | SYSTOLIC BLOOD PRESSURE: 128 MMHG | OXYGEN SATURATION: 97 % | DIASTOLIC BLOOD PRESSURE: 68 MMHG | BODY MASS INDEX: 20.28 KG/M2 | WEIGHT: 120 LBS

## 2022-06-15 PROCEDURE — 99215 OFFICE O/P EST HI 40 MIN: CPT

## 2022-06-15 NOTE — PHYSICAL EXAM
[General Appearance - Well Developed] : well developed [Normal Appearance] : normal appearance [General Appearance - Well Nourished] : well nourished [No Deformities] : no deformities [Neck Appearance] : the appearance of the neck was normal [Heart Rate And Rhythm] : heart rate and rhythm were normal [Murmurs] : no murmurs present [Heart Sounds] : normal S1 and S2 [Edema] : no peripheral edema present [Respiration, Rhythm And Depth] : normal respiratory rhythm and effort [Exaggerated Use Of Accessory Muscles For Inspiration] : no accessory muscle use [Lungs Percussion] : the lungs were normal to percussion [Abnormal Walk] : normal gait [] : no rash [No Focal Deficits] : no focal deficits [Oriented To Time, Place, And Person] : oriented to person, place, and time [Impaired Insight] : insight and judgment were intact [Affect] : the affect was normal [Memory Recent] : recent memory was not impaired [Nail Clubbing] : no clubbing of the fingernails [Cyanosis, Localized] : no localized cyanosis [FreeTextEntry1] : no chest wall abn

## 2022-06-15 NOTE — HISTORY OF PRESENT ILLNESS
[FreeTextEntry1] : at baseline\par has rhinitis\par no sig sputum\par smoking again\par denies any sig dyspnea\par  no fever, chills, chest pain\par has prn ventolin\par post RT r lung, non dx FNA , repeat CT chest with mild increase R pleural fluid\par \par

## 2022-06-15 NOTE — DISCUSSION/SUMMARY
[FreeTextEntry1] : Restrictive physiologic impairment, postthoracotomy for rheumatoid pleural disease, severely decreased DLCO, mild  scarring bases,RA on [place] and Arav[address] Dr Nash\par Non small cell lung cancer felt to be 2 separate primaries\par  RUL,RLL both adenocarcinoma post RT, CT scan 3/22 post RT changes stable, mild incr R eff\par at baseline,last spirometry 12/21 with moderate restriction, decreased flows from 12/19\par no acute infectious complaints\par exam without bronchospasm,decr BS R base normal sp02, prn abx, prn rescue\par Etiology of incr R eff ? RA ? post RT ? malignancy\par Needs repeat CT scan, need for diagnostic thoracentesis post by IR, she has hx of pleurodesis in past\par follow up 6 months or sooner if needed, will call with CT scan\par Unvaccinated\par \par \par \par \par

## 2022-06-15 NOTE — CONSULT LETTER
[Dear  ___] : Dear  [unfilled], [Consult Letter:] : I had the pleasure of evaluating your patient, [unfilled]. [Please see my note below.] : Please see my note below. [Consult Closing:] : Thank you very much for allowing me to participate in the care of this patient.  If you have any questions, please do not hesitate to contact me. [Sincerely,] : Sincerely, [DrMarilynn ___] : Dr. PATEL [[facility], [facility]] : Bandar Rossi DO, [facility] [Director, Respiratory Care] : Director, Respiratory Care [[facility]] : [facility] [DrMarilynn  ___] : Dr. PATEL [FreeTextEntry3] : [facility] DO [facility]\par Pulmonary Critical Care\par Director Pulmonary Division\par Medical Director Respiratory Therapy\par [facility]\par \par

## 2022-12-28 ENCOUNTER — APPOINTMENT (OUTPATIENT)
Dept: INTERVENTIONAL RADIOLOGY/VASCULAR | Facility: CLINIC | Age: 72
End: 2022-12-28
Payer: MEDICARE

## 2022-12-28 ENCOUNTER — OUTPATIENT (OUTPATIENT)
Dept: OUTPATIENT SERVICES | Facility: HOSPITAL | Age: 72
LOS: 1 days | End: 2022-12-28

## 2022-12-28 VITALS
DIASTOLIC BLOOD PRESSURE: 83 MMHG | RESPIRATION RATE: 16 BRPM | TEMPERATURE: 98.3 F | SYSTOLIC BLOOD PRESSURE: 174 MMHG | HEART RATE: 118 BPM | OXYGEN SATURATION: 99 %

## 2022-12-28 DIAGNOSIS — Z00.00 ENCOUNTER FOR GENERAL ADULT MEDICAL EXAMINATION WITHOUT ABNORMAL FINDINGS: ICD-10-CM

## 2022-12-28 PROCEDURE — 36410 VNPNXR 3YR/> PHY/QHP DX/THER: CPT

## 2022-12-28 PROCEDURE — 76937 US GUIDE VASCULAR ACCESS: CPT | Mod: 26

## 2022-12-28 NOTE — HISTORY OF PRESENT ILLNESS
[FreeTextEntry1] : History of Present Illness\par PRE CALL PRIOR TO APPOINTMENT: \par Phone Number: \par COVID-19 SWAB: advised\par RX on file: yes\par Referring MD:\par Clotting or Bleeding disorders: \par NPO status advised: n/a\par History of fall: no\par Assistant device for walking: no\par  home: \par Blood Thinners: no \par Prescribing MD agree to have blood thinner medication held: n/a\par Capacity to make decisions: yes\par HCP: no \par DNR: no \par \par Person contact for Pre-Call:  \par Ml- Operative Assessment: (Day of Procedure)\par NPO status:n/a\par Accompanied by: self\par Falls risk: no\par Labs: IN CHART REVIEWED \par IVL:  n/a\par IR MD: josh\par Urine Pregnancy: n/a\par PRE-OP instructions:yes\par POST-OP teaching initiated: yes\par Allergy bracelet on: nkda\par Antibiotic given: no\par \par

## 2022-12-28 NOTE — ASSESSMENT
[FreeTextEntry1] : IR Post Procedure Note\par \par Procedure: MidlinePlacement\par \par Diagnosis: Long Term IV Access Required\par \par Contrast: None\par \par Anesthesia: 1% Lidocaine subcutaneous\par \par Estimated Blood Loss: Less than 10cc\par \par Specimens: None\par \par Complications: No Immediate Complications\par \par Findings: SL Midline placed in the left basilic vein. Tip at cavoatrial junction, works well, flushed with saline, OK to use\par

## 2023-01-04 ENCOUNTER — APPOINTMENT (OUTPATIENT)
Dept: PULMONOLOGY | Facility: CLINIC | Age: 73
End: 2023-01-04
Payer: MEDICARE

## 2023-01-04 VITALS
HEIGHT: 64.5 IN | OXYGEN SATURATION: 98 % | DIASTOLIC BLOOD PRESSURE: 68 MMHG | BODY MASS INDEX: 21.93 KG/M2 | RESPIRATION RATE: 14 BRPM | WEIGHT: 130 LBS | SYSTOLIC BLOOD PRESSURE: 118 MMHG

## 2023-01-04 DIAGNOSIS — J98.4 OTHER DISORDERS OF LUNG: ICD-10-CM

## 2023-01-04 PROCEDURE — 99406 BEHAV CHNG SMOKING 3-10 MIN: CPT

## 2023-01-04 PROCEDURE — 99214 OFFICE O/P EST MOD 30 MIN: CPT | Mod: 25

## 2023-01-04 NOTE — CONSULT LETTER
[Dear  ___] : Dear  [unfilled], [Consult Letter:] : I had the pleasure of evaluating your patient, [unfilled]. [Please see my note below.] : Please see my note below. [Consult Closing:] : Thank you very much for allowing me to participate in the care of this patient.  If you have any questions, please do not hesitate to contact me. [Sincerely,] : Sincerely, [facility] : [facility], [facility] [Director, Respiratory Care] : Director, Respiratory Care [[facility]] : [facility] [FreeTextEntry3] : Band[facility] Ross[facility] DO [facility]\par Pulmonary Critical Care\par Director Pulmonary Division\par Medical Director Respiratory Therapy\par [facility]\par \par  [DrMarilynn ___] : Dr. PATEL

## 2023-01-04 NOTE — DISCUSSION/SUMMARY
[FreeTextEntry1] : Restrictive physiologic impairment, postthoracotomy for rheumatoid pleural disease, severely decreased DLCO, mild  scarring bases,RA on MTX\par Non small cell lung cancer felt to be 2 separate primaries\par  RUL,RLL both adenocarcinoma post RT,Follow up Oncology Dr Rascon, repeat CT scan planned\par at baseline,needs updated PFTs\par smoking cessation discussed\par no acute pulmonary  complaints\par exam without bronchospasm, normal sp02, prn abx, prn rescue\par vaccinations \par follow up 4 months or sooner if needed with PFts\par \par \par \par \par

## 2023-01-04 NOTE — HISTORY OF PRESENT ILLNESS
[FreeTextEntry1] : at baseline\par on IV abx for foot infection, followed by podiatry and ID\par no sig sputum\par smoke free\par denies any sig dyspnea\par  no fever, chills, chest pain\par has prn ventolin\par post RT r lung, non dx FNA , Last CT 7/22 stable\par smoking 1/2 ppd or less\par \par

## 2023-01-04 NOTE — COUNSELING
[Cessation strategies including cessation program discussed] : Cessation strategies including cessation program discussed [Use of nicotine replacement therapies and other medications discussed] : Use of nicotine replacement therapies and other medications discussed [Encouraged to pick a quit date and identify support needed to quit] : Encouraged to pick a quit date and identify support needed to quit [Smoking Cessation Program Referral] : Smoking Cessation Program Referral  [FreeTextEntry1] : 4

## 2023-05-03 ENCOUNTER — APPOINTMENT (OUTPATIENT)
Dept: PULMONOLOGY | Facility: CLINIC | Age: 73
End: 2023-05-03

## 2023-05-30 ENCOUNTER — APPOINTMENT (OUTPATIENT)
Dept: PULMONOLOGY | Facility: CLINIC | Age: 73
End: 2023-05-30
Payer: MEDICARE

## 2023-05-30 VITALS — OXYGEN SATURATION: 97 % | RESPIRATION RATE: 16 BRPM | HEART RATE: 70 BPM

## 2023-05-30 VITALS — BODY MASS INDEX: 22.49 KG/M2 | WEIGHT: 135 LBS | HEIGHT: 65 IN

## 2023-05-30 PROCEDURE — 94729 DIFFUSING CAPACITY: CPT

## 2023-05-30 PROCEDURE — 99406 BEHAV CHNG SMOKING 3-10 MIN: CPT

## 2023-05-30 PROCEDURE — 85018 HEMOGLOBIN: CPT | Mod: QW

## 2023-05-30 PROCEDURE — 94010 BREATHING CAPACITY TEST: CPT

## 2023-05-30 PROCEDURE — 94727 GAS DIL/WSHOT DETER LNG VOL: CPT

## 2023-05-30 PROCEDURE — 99215 OFFICE O/P EST HI 40 MIN: CPT | Mod: 25

## 2023-05-30 RX ORDER — TOCILIZUMAB 20 MG/ML
INJECTION, SOLUTION, CONCENTRATE INTRAVENOUS
Refills: 0 | Status: COMPLETED | COMMUNITY
End: 2023-05-30

## 2023-05-30 RX ORDER — LISINOPRIL 20 MG/1
20 TABLET ORAL
Refills: 0 | Status: COMPLETED | COMMUNITY
Start: 2019-01-10 | End: 2023-05-30

## 2023-05-30 RX ORDER — AMLODIPINE BESYLATE 10 MG/1
10 TABLET ORAL
Qty: 90 | Refills: 0 | Status: COMPLETED | COMMUNITY
Start: 2021-09-26 | End: 2023-05-30

## 2023-05-30 RX ORDER — LEFLUNOMIDE 10 MG/1
TABLET, FILM COATED ORAL
Refills: 0 | Status: ACTIVE | COMMUNITY

## 2023-06-13 NOTE — CONSULT LETTER
[Dear  ___] : Dear  [unfilled], [Consult Letter:] : I had the pleasure of evaluating your patient, [unfilled]. [Please see my note below.] : Please see my note below. [Consult Closing:] : Thank you very much for allowing me to participate in the care of this patient.  If you have any questions, please do not hesitate to contact me. [Sincerely,] : Sincerely, [DrMarilynn ___] : Dr. PATEL [[facility], [facility]] : [facility], [facility] [Director, Respiratory Care] : Director, Respiratory Care [[facility]] : [facility] [FreeTextEntry3] : [facility]\par Pulmonary Critical Care\par Director Pulmonary Division\par Medical Director Respiratory Therapy\par [facility]\par \par

## 2023-06-13 NOTE — HISTORY OF PRESENT ILLNESS
[>= 20 pack years] : >= 20 pack years [TextBox_11] : 1/4 [FreeTextEntry1] : no sig sputum\par worsening  dyspnea/fatigue\par  no fever, chills, chest pain\par has prn ventolin\par CT scan with increasing nodule,Getting PET scan\par smoking 1/4 ppd or less- does not want to discuss-gets charged\par \par

## 2023-06-13 NOTE — DISCUSSION/SUMMARY
[FreeTextEntry1] : Restrictive physiologic impairment, postthoracotomy for rheumatoid pleural disease, severely decreased DLCO, mild  scarring bases,RA on Arava\par Non small cell lung cancer felt to be 2 separate primaries\par  RUL,RLL both adenocarcinoma post RT,Follow up Oncology Dr Rascon, repeat CT scan  5/23-with increased consolidation RUL and RLL, PET scan planned\par complaining of fatigue , some dyspnea, PFts today with moderate air flow obstruction, no sig change in concomitant mild restriction ( TLC 70%), downward trend in severely impaired DLCO ( 40%) from 12/21\par smoking cessation discussed\par exam without bronchospasm, normal sp02, prn abx, prn rescue\par Start Anoro daily , technique reviewed, samples given\par follow up 3 months or sooner if needed will contact with PET\par \par \par \par \par

## 2023-06-13 NOTE — ADDENDUM
[FreeTextEntry1] : pt called, denies any SOB or CP\par doing well on current regimen\par PET reviewed, for EBUs- unclear how efficacious, but she had problems with last FNA\par No pulmonary contraindications, increased risk of post operative pulmonary complications\par Risk/benefit reviewed\par Anoro AM of procedure\par Duo neb q 4-6 hrs, close monitoring sp02\par

## 2023-08-16 ENCOUNTER — APPOINTMENT (OUTPATIENT)
Dept: PULMONOLOGY | Facility: CLINIC | Age: 73
End: 2023-08-16
Payer: MEDICARE

## 2023-08-16 ENCOUNTER — RX CHANGE (OUTPATIENT)
Age: 73
End: 2023-08-16

## 2023-08-16 VITALS — BODY MASS INDEX: 22.47 KG/M2 | WEIGHT: 135 LBS

## 2023-08-16 VITALS
RESPIRATION RATE: 16 BRPM | SYSTOLIC BLOOD PRESSURE: 115 MMHG | OXYGEN SATURATION: 97 % | DIASTOLIC BLOOD PRESSURE: 70 MMHG | HEART RATE: 72 BPM

## 2023-08-16 DIAGNOSIS — R91.8 OTHER NONSPECIFIC ABNORMAL FINDING OF LUNG FIELD: ICD-10-CM

## 2023-08-16 DIAGNOSIS — F17.200 NICOTINE DEPENDENCE, UNSPECIFIED, UNCOMPLICATED: ICD-10-CM

## 2023-08-16 DIAGNOSIS — J44.9 CHRONIC OBSTRUCTIVE PULMONARY DISEASE, UNSPECIFIED: ICD-10-CM

## 2023-08-16 PROCEDURE — 99215 OFFICE O/P EST HI 40 MIN: CPT

## 2023-08-16 NOTE — DISCUSSION/SUMMARY
[FreeTextEntry1] : Restrictive physiologic impairment, post thoracotomy for rheumatoid pleural disease, severely decreased DLCO, mild  scarring bases,RA on Arava Non small cell lung cancer felt to be 2 separate primaries  RUL,RLL both adenocarcinoma post RT,Follow up Oncology Dr Rascon, post Bronchoscopy RUL no growth AFB, Fungal, cytology negative by hx, FNA planned Discussed with pt agree with FNA, but would repeat CT prior Continue Anoro smoking cessation discussed exam without bronchospasm, normal sp02, prn abx, prn rescue follow up 3 months or sooner if needed

## 2023-08-16 NOTE — HISTORY OF PRESENT ILLNESS
[Current] : current [>= 20 pack years] : >= 20 pack years [TextBox_11] : 1/4 [FreeTextEntry1] : no sig sputum at baseline  no fever, chills, chest pain has prn ventolin CT scan with increasing nodular density RUL- post Bronchoscopy, non dx by hx smoking 1/4 ppd or less-  Benefit of Anoro noted

## 2023-08-16 NOTE — CONSULT LETTER
[Dear  ___] : Dear  [unfilled], [Consult Letter:] : I had the pleasure of evaluating your patient, [unfilled]. [Please see my note below.] : Please see my note below. [Consult Closing:] : Thank you very much for allowing me to participate in the care of this patient.  If you have any questions, please do not hesitate to contact me. [Sincerely,] : Sincerely, [DrMarilynn ___] : Dr. PATEL [facility], [facility]] : [facility], [facility] [Director, Respiratory Care] : Director, Respiratory Care [[facility]] : [facility] [FreeTextEntry3] : [facility]\par  Pulmonary Critical Care\par  Director Pulmonary Division\par  Medical Director Respiratory Therapy\par  [facility]\par  \par

## 2023-08-16 NOTE — PHYSICAL EXAM
[General Appearance - Well Developed] : well developed [Normal Appearance] : normal appearance [No Deformities] : no deformities [General Appearance - Well Nourished] : well nourished [Neck Appearance] : the appearance of the neck was normal [Heart Rate And Rhythm] : heart rate and rhythm were normal [Heart Sounds] : normal S1 and S2 [Murmurs] : no murmurs present [Edema] : no peripheral edema present [Respiration, Rhythm And Depth] : normal respiratory rhythm and effort [Exaggerated Use Of Accessory Muscles For Inspiration] : no accessory muscle use [Lungs Percussion] : the lungs were normal to percussion [Abnormal Walk] : normal gait [] : no rash [No Focal Deficits] : no focal deficits [Oriented To Time, Place, And Person] : oriented to person, place, and time [Impaired Insight] : insight and judgment were intact [Affect] : the affect was normal [Memory Recent] : recent memory was not impaired [Nail Clubbing] : no clubbing of the fingernails [Cyanosis, Localized] : no localized cyanosis [FreeTextEntry1] : no chest wall abn

## 2023-09-19 RX ORDER — UMECLIDINIUM BROMIDE AND VILANTEROL TRIFENATATE 62.5; 25 UG/1; UG/1
62.5-25 POWDER RESPIRATORY (INHALATION)
Qty: 3 | Refills: 3 | Status: ACTIVE | COMMUNITY
Start: 2023-08-16 | End: 1900-01-01

## 2023-11-17 ENCOUNTER — APPOINTMENT (OUTPATIENT)
Dept: PULMONOLOGY | Facility: CLINIC | Age: 73
End: 2023-11-17
Payer: MEDICARE

## 2023-11-17 VITALS
HEART RATE: 76 BPM | SYSTOLIC BLOOD PRESSURE: 124 MMHG | OXYGEN SATURATION: 95 % | DIASTOLIC BLOOD PRESSURE: 68 MMHG | RESPIRATION RATE: 16 BRPM

## 2023-11-17 PROCEDURE — 99214 OFFICE O/P EST MOD 30 MIN: CPT

## 2024-03-21 ENCOUNTER — APPOINTMENT (OUTPATIENT)
Dept: PULMONOLOGY | Facility: CLINIC | Age: 74
End: 2024-03-21

## 2024-05-13 ENCOUNTER — APPOINTMENT (OUTPATIENT)
Dept: PULMONOLOGY | Facility: CLINIC | Age: 74
End: 2024-05-13
Payer: MEDICARE

## 2024-05-13 VITALS
RESPIRATION RATE: 16 BRPM | HEART RATE: 77 BPM | HEIGHT: 65 IN | SYSTOLIC BLOOD PRESSURE: 126 MMHG | DIASTOLIC BLOOD PRESSURE: 74 MMHG | WEIGHT: 132 LBS | BODY MASS INDEX: 21.99 KG/M2 | OXYGEN SATURATION: 93 %

## 2024-05-13 VITALS — OXYGEN SATURATION: 95 %

## 2024-05-13 DIAGNOSIS — R06.09 OTHER FORMS OF DYSPNEA: ICD-10-CM

## 2024-05-13 DIAGNOSIS — R07.81 PLEURODYNIA: ICD-10-CM

## 2024-05-13 DIAGNOSIS — G47.33 OBSTRUCTIVE SLEEP APNEA (ADULT) (PEDIATRIC): ICD-10-CM

## 2024-05-13 DIAGNOSIS — J90 PLEURAL EFFUSION, NOT ELSEWHERE CLASSIFIED: ICD-10-CM

## 2024-05-13 DIAGNOSIS — C34.90 MALIGNANT NEOPLASM OF UNSPECIFIED PART OF UNSPECIFIED BRONCHUS OR LUNG: ICD-10-CM

## 2024-05-13 DIAGNOSIS — R91.8 OTHER NONSPECIFIC ABNORMAL FINDING OF LUNG FIELD: ICD-10-CM

## 2024-05-13 DIAGNOSIS — F17.219 NICOTINE DEPENDENCE, CIGARETTES, WITH UNSPECIFIED NICOTINE-INDUCED DISORDERS: ICD-10-CM

## 2024-05-13 PROCEDURE — G2211 COMPLEX E/M VISIT ADD ON: CPT

## 2024-05-13 PROCEDURE — 99214 OFFICE O/P EST MOD 30 MIN: CPT

## 2024-05-13 NOTE — CONSULT LETTER
[Dear  ___] : Dear  [unfilled], [Consult Letter:] : I had the pleasure of evaluating your patient, [unfilled]. [Please see my note below.] : Please see my note below. [Consult Closing:] : Thank you very much for allowing me to participate in the care of this patient.  If you have any questions, please do not hesitate to contact me. [Sincerely,] : Sincerely, [DrMarilynn ___] : Dr. PATEL [facility], [facility]] : [facility], [facility] [Director, Respiratory Care] : Director, Respiratory Care [[facility]] : [facility] [FreeTextEntry3] : [facility] DO [facility]\par  Pulmonary Critical Care\par  Director Pulmonary Division\par  Medical Director Respiratory Therapy\par  [facility]\par  \par

## 2024-05-13 NOTE — HISTORY OF PRESENT ILLNESS
[Current] : current [>= 20 pack years] : >= 20 pack years [TextBox_11] : 1/4 [FreeTextEntry1] : no sig sputum worsening  dyspnea /fatigue pleuritic cp new  no fever, chills, chest pain has prn ventolin CT scan stable 2/24 compared to 10/23 smoking 1/4 ppd or less- does not want to discuss-gets charged Using Anoro daily, notes benefit

## 2024-05-13 NOTE — DISCUSSION/SUMMARY
[FreeTextEntry1] : COPD with combined Restrictive physiologic impairment, postthoracotomy for rheumatoid pleural disease, severely decreased DLCO,  Non small cell lung cancer felt to be 2 separate primaries  RUL,RLL both adenocarcinoma post RT, Follow up Oncology Dr Rascon, repeat CT scan   stable 2/24 Complaining of new onset pleuritic CP and chronic fatigue, saw rheumatology labs WNL per pt smoking cessation discussed exam without bronchospasm, normal sp02, prn abx, prn rescue Discussed ER, she doesnt want, will obtain stat CTA GSH, home sleep study follow up 4 months or sooner if needed with [facility], will contact with CTA

## 2024-06-19 ENCOUNTER — OFFICE (OUTPATIENT)
Dept: URBAN - METROPOLITAN AREA CLINIC 104 | Facility: CLINIC | Age: 74
Setting detail: OPHTHALMOLOGY
End: 2024-06-19
Payer: MEDICARE

## 2024-06-19 ENCOUNTER — RX ONLY (RX ONLY)
Age: 74
End: 2024-06-19

## 2024-06-19 DIAGNOSIS — H43.813: ICD-10-CM

## 2024-06-19 DIAGNOSIS — H40.053: ICD-10-CM

## 2024-06-19 DIAGNOSIS — H25.12: ICD-10-CM

## 2024-06-19 DIAGNOSIS — H35.40: ICD-10-CM

## 2024-06-19 DIAGNOSIS — H25.13: ICD-10-CM

## 2024-06-19 PROBLEM — H25.11 CATARACT SENILE NUCLEAR SCLEROSIS; RIGHT EYE, LEFT EYE, BOTH EYES ;
WITH CORTICAL OU: Status: ACTIVE | Noted: 2024-06-19

## 2024-06-19 PROCEDURE — 92004 COMPRE OPH EXAM NEW PT 1/>: CPT | Performed by: SPECIALIST

## 2024-06-19 PROCEDURE — 92133 CPTRZD OPH DX IMG PST SGM ON: CPT | Performed by: SPECIALIST

## 2024-06-19 PROCEDURE — 92136 OPHTHALMIC BIOMETRY: CPT | Mod: LT | Performed by: SPECIALIST

## 2024-06-19 ASSESSMENT — CONFRONTATIONAL VISUAL FIELD TEST (CVF)
OD_FINDINGS: FULL
OS_FINDINGS: FULL

## 2024-08-15 ENCOUNTER — APPOINTMENT (OUTPATIENT)
Dept: PULMONOLOGY | Facility: CLINIC | Age: 74
End: 2024-08-15
Payer: MEDICARE

## 2024-08-15 VITALS
OXYGEN SATURATION: 96 % | WEIGHT: 135 LBS | SYSTOLIC BLOOD PRESSURE: 145 MMHG | HEIGHT: 65 IN | HEART RATE: 71 BPM | RESPIRATION RATE: 16 BRPM | BODY MASS INDEX: 22.49 KG/M2 | DIASTOLIC BLOOD PRESSURE: 81 MMHG

## 2024-08-15 VITALS — HEIGHT: 65 IN | WEIGHT: 135 LBS | BODY MASS INDEX: 22.49 KG/M2

## 2024-08-15 DIAGNOSIS — R06.09 OTHER FORMS OF DYSPNEA: ICD-10-CM

## 2024-08-15 DIAGNOSIS — J90 PLEURAL EFFUSION, NOT ELSEWHERE CLASSIFIED: ICD-10-CM

## 2024-08-15 DIAGNOSIS — C34.90 MALIGNANT NEOPLASM OF UNSPECIFIED PART OF UNSPECIFIED BRONCHUS OR LUNG: ICD-10-CM

## 2024-08-15 DIAGNOSIS — F17.219 NICOTINE DEPENDENCE, CIGARETTES, WITH UNSPECIFIED NICOTINE-INDUCED DISORDERS: ICD-10-CM

## 2024-08-15 DIAGNOSIS — J94.9 PLEURAL CONDITION, UNSPECIFIED: ICD-10-CM

## 2024-08-15 DIAGNOSIS — R91.8 OTHER NONSPECIFIC ABNORMAL FINDING OF LUNG FIELD: ICD-10-CM

## 2024-08-15 PROCEDURE — 99214 OFFICE O/P EST MOD 30 MIN: CPT | Mod: 25

## 2024-08-15 PROCEDURE — 94010 BREATHING CAPACITY TEST: CPT

## 2024-08-15 NOTE — PROCEDURE
[FreeTextEntry1] : CT scan reviewed GSH ,stable 2/24 - 10/23, CT coronary 2/24 stable except GG bases ? unable to view

## 2024-08-15 NOTE — HISTORY OF PRESENT ILLNESS
[Current] : current [>= 20 pack years] : >= 20 pack years [TextBox_11] : 1/4 [FreeTextEntry1] : at baseline no acute pulmonary complaints bothered by back pain  no fever, chills, chest pain has prn ventolin CT scan stable 2/24 compared to 10/23 smoking 1/4 ppd or  Using Anoro daily, notes benefit

## 2024-08-15 NOTE — CONSULT LETTER
[Dear  ___] : Dear  [unfilled], [Consult Letter:] : I had the pleasure of evaluating your patient, [unfilled]. [Please see my note below.] : Please see my note below. [Consult Closing:] : Thank you very much for allowing me to participate in the care of this patient.  If you have any questions, please do not hesitate to contact me. [Sincerely,] : Sincerely, [DrMarilynn ___] : Dr. PATEL [[facility], [facility]] : [facility], [facility] [Director, Respiratory Care] : Director, Respiratory Care [[facility]] : [facility] [FreeTextEntry3] : [facility]\par  Pulmonary Critical Care\par  Director Pulmonary Division\par  Medical Director Respiratory Therapy\par  [facility]\par  \par

## 2024-08-15 NOTE — DISCUSSION/SUMMARY
[FreeTextEntry1] : COPD with combined Restrictive physiologic impairment, postthoracotomy for rheumatoid pleural disease, severely decreased DLCO,  Non small cell lung cancer felt to be 2 separate primaries  RUL,RLL both adenocarcinoma post RT, Follow up Oncology Dr Rascon, repeat CT scan   stable 2/24  ? new GG on CT coronary - unal eto view lung windows , will repeat scan smoking cessation discussed exam without bronchospasm, normal sp02, prn abx, prn rescue Continue Anoro daily, prn rescue Spirometry more restrictive today, but limited by back pain Followed by Cardiology Dr Portillo follow up 6 months or sooner if needed , will contact with CT

## 2024-08-20 ENCOUNTER — TRANSCRIPTION ENCOUNTER (OUTPATIENT)
Age: 74
End: 2024-08-20

## 2024-08-21 ENCOUNTER — OUTPATIENT (OUTPATIENT)
Dept: OUTPATIENT SERVICES | Facility: HOSPITAL | Age: 74
LOS: 1 days | End: 2024-08-21
Payer: MEDICARE

## 2024-08-21 DIAGNOSIS — M54.16 RADICULOPATHY, LUMBAR REGION: ICD-10-CM

## 2024-08-21 PROCEDURE — 62323 NJX INTERLAMINAR LMBR/SAC: CPT

## 2024-09-16 ENCOUNTER — APPOINTMENT (OUTPATIENT)
Dept: PULMONOLOGY | Facility: CLINIC | Age: 74
End: 2024-09-16

## 2024-10-18 ENCOUNTER — RX ONLY (RX ONLY)
Age: 74
End: 2024-10-18

## 2024-10-18 ENCOUNTER — ASC (OUTPATIENT)
Dept: URBAN - METROPOLITAN AREA SURGERY 8 | Facility: SURGERY | Age: 74
Setting detail: OPHTHALMOLOGY
End: 2024-10-18
Payer: MEDICARE

## 2024-10-18 DIAGNOSIS — H25.12: ICD-10-CM

## 2024-10-18 PROCEDURE — 66984 XCAPSL CTRC RMVL W/O ECP: CPT | Mod: LT | Performed by: SPECIALIST

## 2024-10-19 ENCOUNTER — OFFICE (OUTPATIENT)
Dept: URBAN - METROPOLITAN AREA CLINIC 104 | Facility: CLINIC | Age: 74
Setting detail: OPHTHALMOLOGY
End: 2024-10-19
Payer: MEDICARE

## 2024-10-19 DIAGNOSIS — Z96.1: ICD-10-CM

## 2024-10-19 PROCEDURE — 99024 POSTOP FOLLOW-UP VISIT: CPT | Performed by: OPTOMETRIST

## 2024-10-19 ASSESSMENT — REFRACTION_MANIFEST
OD_CYLINDER: -0.50
OD_VA1: 20/50
OS_VA1: 20/50
OS_SPHERE: -8.00
OD_AXIS: 010
OS_AXIS: 130
OS_CYLINDER: -0.50
OD_SPHERE: -9.00

## 2024-10-19 ASSESSMENT — VISUAL ACUITY
OS_BCVA: 20/60
OD_BCVA: 20/50

## 2024-10-19 ASSESSMENT — REFRACTION_CURRENTRX
OD_CYLINDER: -0.25
OD_SPHERE: -9.50
OS_SPHERE: -7.25
OS_AXIS: 127
OD_OVR_VA: 20/
OS_CYLINDER: -1.50
OS_OVR_VA: 20/
OD_AXIS: 010

## 2024-10-19 ASSESSMENT — KERATOMETRY
OS_K1POWER_DIOPTERS: 43.44
OD_K2POWER_DIOPTERS: 43.95
OS_AXISANGLE_DEGREES: 066
OS_K2POWER_DIOPTERS: 44.35
OD_AXISANGLE_DEGREES: 113
OD_K1POWER_DIOPTERS: 43.32

## 2024-10-19 ASSESSMENT — REFRACTION_AUTOREFRACTION
OS_SPHERE: -9.25
OD_SPHERE: -9.00
OD_CYLINDER: -1.00
OS_AXIS: 160
OD_AXIS: 033
OS_CYLINDER: -0.50

## 2024-10-19 ASSESSMENT — PACHYMETRY
OD_CT_CORRECTION: 0
OS_CT_CORRECTION: 0
OD_CT_UM: 540
OS_CT_UM: 545

## 2024-10-19 ASSESSMENT — CONFRONTATIONAL VISUAL FIELD TEST (CVF)
OD_FINDINGS: FULL
OS_FINDINGS: FULL

## 2024-10-25 ENCOUNTER — OFFICE (OUTPATIENT)
Dept: URBAN - METROPOLITAN AREA CLINIC 104 | Facility: CLINIC | Age: 74
Setting detail: OPHTHALMOLOGY
End: 2024-10-25
Payer: MEDICARE

## 2024-10-25 DIAGNOSIS — H25.11: ICD-10-CM

## 2024-10-25 DIAGNOSIS — Z96.1: ICD-10-CM

## 2024-10-25 PROCEDURE — 99024 POSTOP FOLLOW-UP VISIT: CPT | Performed by: OPTOMETRIST

## 2024-10-25 PROCEDURE — 92136 OPHTHALMIC BIOMETRY: CPT | Mod: RT | Performed by: SPECIALIST

## 2024-10-25 ASSESSMENT — REFRACTION_AUTOREFRACTION
OD_CYLINDER: -1.00
OS_SPHERE: -2.00
OD_SPHERE: -9.00
OS_AXIS: 6
OS_CYLINDER: -0.25
OS_AXIS: 6
OS_SPHERE: -2.00
OS_CYLINDER: -0.25
OD_AXIS: 033
OD_SPHERE: -9.00
OD_AXIS: 033
OD_CYLINDER: -1.00

## 2024-10-25 ASSESSMENT — REFRACTION_CURRENTRX
OS_OVR_VA: 20/
OD_CYLINDER: -0.25
OD_OVR_VA: 20/
OS_CYLINDER: -1.50
OS_AXIS: 127
OD_OVR_VA: 20/
OS_CYLINDER: -1.50
OS_AXIS: 127
OD_AXIS: 010
OS_SPHERE: -7.25
OD_SPHERE: -9.50
OD_AXIS: 010
OD_SPHERE: -9.50
OS_OVR_VA: 20/
OS_SPHERE: -7.25
OD_CYLINDER: -0.25

## 2024-10-25 ASSESSMENT — REFRACTION_MANIFEST
OD_AXIS: 010
OS_SPHERE: -8.00
OS_AXIS: 130
OS_CYLINDER: -0.50
OD_SPHERE: -9.00
OD_CYLINDER: -0.50
OS_SPHERE: -8.00
OD_VA1: 20/50
OD_CYLINDER: -0.50
OS_CYLINDER: -0.50
OS_VA1: 20/50
OS_VA1: 20/50
OD_AXIS: 010
OS_AXIS: 130
OD_SPHERE: -9.00
OD_VA1: 20/50

## 2024-10-25 ASSESSMENT — VISUAL ACUITY
OS_BCVA: 20/60
OD_BCVA: 20/40
OS_BCVA: 20/60
OD_BCVA: 20/40

## 2024-10-25 ASSESSMENT — KERATOMETRY
OD_AXISANGLE_DEGREES: 113
OD_K2POWER_DIOPTERS: 43.95
OD_K2POWER_DIOPTERS: 43.95
OD_K1POWER_DIOPTERS: 43.32
OD_AXISANGLE_DEGREES: 113
OD_K1POWER_DIOPTERS: 43.32

## 2024-10-25 ASSESSMENT — CONFRONTATIONAL VISUAL FIELD TEST (CVF)
OD_FINDINGS: FULL
OS_FINDINGS: FULL
OS_FINDINGS: FULL
OD_FINDINGS: FULL

## 2024-11-29 ENCOUNTER — ASC (OUTPATIENT)
Dept: URBAN - METROPOLITAN AREA SURGERY 8 | Facility: SURGERY | Age: 74
Setting detail: OPHTHALMOLOGY
End: 2024-11-29
Payer: MEDICARE

## 2024-11-29 DIAGNOSIS — H25.11: ICD-10-CM

## 2024-11-29 PROCEDURE — 66984 XCAPSL CTRC RMVL W/O ECP: CPT | Mod: 79,RT | Performed by: SPECIALIST

## 2024-11-30 ENCOUNTER — OFFICE (OUTPATIENT)
Dept: URBAN - METROPOLITAN AREA CLINIC 104 | Facility: CLINIC | Age: 74
Setting detail: OPHTHALMOLOGY
End: 2024-11-30
Payer: MEDICARE

## 2024-11-30 DIAGNOSIS — Z96.1: ICD-10-CM

## 2024-11-30 PROCEDURE — 99024 POSTOP FOLLOW-UP VISIT: CPT | Performed by: OPTOMETRIST

## 2024-11-30 ASSESSMENT — CONFRONTATIONAL VISUAL FIELD TEST (CVF)
OS_FINDINGS: FULL
OD_FINDINGS: FULL

## 2024-11-30 ASSESSMENT — REFRACTION_CURRENTRX
OS_AXIS: 127
OS_SPHERE: -7.25
OD_SPHERE: -9.50
OS_OVR_VA: 20/
OD_CYLINDER: -0.25
OD_OVR_VA: 20/
OD_AXIS: 010
OS_CYLINDER: -1.50

## 2024-11-30 ASSESSMENT — REFRACTION_MANIFEST
OS_AXIS: 130
OD_SPHERE: -9.00
OD_CYLINDER: -0.50
OD_AXIS: 010
OS_CYLINDER: -0.50
OS_SPHERE: -8.00
OD_VA1: 20/50
OS_VA1: 20/50

## 2024-11-30 ASSESSMENT — VISUAL ACUITY
OS_BCVA: 20/250-1
OD_BCVA: 20/40

## 2024-12-04 ENCOUNTER — OFFICE (OUTPATIENT)
Dept: URBAN - METROPOLITAN AREA CLINIC 104 | Facility: CLINIC | Age: 74
Setting detail: OPHTHALMOLOGY
End: 2024-12-04
Payer: MEDICARE

## 2024-12-04 DIAGNOSIS — Z96.1: ICD-10-CM

## 2024-12-04 PROCEDURE — 99024 POSTOP FOLLOW-UP VISIT: CPT | Performed by: OPTOMETRIST

## 2024-12-04 ASSESSMENT — KERATOMETRY
OS_K2POWER_DIOPTERS: 44.12
OD_K1POWER_DIOPTERS: 43.10
OD_K2POWER_DIOPTERS: 44.06
OS_K1POWER_DIOPTERS: 43.49
OD_AXISANGLE_DEGREES: 110
OS_AXISANGLE_DEGREES: 066

## 2024-12-04 ASSESSMENT — VISUAL ACUITY
OD_BCVA: 20/60
OS_BCVA: 20/70-1

## 2024-12-04 ASSESSMENT — REFRACTION_AUTOREFRACTION
OS_AXIS: 034
OD_CYLINDER: -1.25
OS_SPHERE: 0.00
OD_AXIS: 072
OD_SPHERE: -0.25
OS_CYLINDER: -2.00

## 2024-12-04 ASSESSMENT — REFRACTION_CURRENTRX
OD_OVR_VA: 20/
OS_OVR_VA: 20/

## 2024-12-04 ASSESSMENT — TONOMETRY
OS_IOP_MMHG: 21
OD_IOP_MMHG: 21

## 2024-12-04 ASSESSMENT — CORNEAL EDEMA CLINICAL DESCRIPTION: OD_CORNEALEDEMA: ABSENT

## 2024-12-18 NOTE — PHYSICAL EXAM
[General Appearance - Well Developed] : well developed [Normal Appearance] : normal appearance [General Appearance - Well Nourished] : well nourished [No Deformities] : no deformities [Neck Appearance] : the appearance of the neck was normal [Heart Rate And Rhythm] : heart rate and rhythm were normal [Heart Sounds] : normal S1 and S2 [Murmurs] : no murmurs present [Edema] : no peripheral edema present [Respiration, Rhythm And Depth] : normal respiratory rhythm and effort [Exaggerated Use Of Accessory Muscles For Inspiration] : no accessory muscle use [Lungs Percussion] : the lungs were normal to percussion [Abnormal Walk] : normal gait [] : no rash [No Focal Deficits] : no focal deficits [Oriented To Time, Place, And Person] : oriented to person, place, and time [Impaired Insight] : insight and judgment were intact [Affect] : the affect was normal [Memory Recent] : recent memory was not impaired [Nail Clubbing] : no clubbing of the fingernails [Cyanosis, Localized] : no localized cyanosis [FreeTextEntry1] : no chest wall abn hard copy, drawn during this pregnancy

## 2025-01-03 ENCOUNTER — OFFICE (OUTPATIENT)
Dept: URBAN - METROPOLITAN AREA CLINIC 104 | Facility: CLINIC | Age: 75
Setting detail: OPHTHALMOLOGY
End: 2025-01-03
Payer: MEDICARE

## 2025-01-03 DIAGNOSIS — Z96.1: ICD-10-CM

## 2025-01-03 PROCEDURE — 99024 POSTOP FOLLOW-UP VISIT: CPT | Performed by: OPTOMETRIST

## 2025-01-03 ASSESSMENT — TONOMETRY
OD_IOP_MMHG: 20
OS_IOP_MMHG: 18

## 2025-01-03 ASSESSMENT — REFRACTION_MANIFEST
OS_ADD: +2.50
OD_VA1: 20/20
OD_ADD: +2.50
OS_VA1: 20/25+2
OD_CYLINDER: SPH
OS_AXIS: 135
OS_SPHERE: -0.75
OS_CYLINDER: -1.25
OD_SPHERE: -2.00

## 2025-01-03 ASSESSMENT — REFRACTION_AUTOREFRACTION
OD_CYLINDER: -2.00
OS_AXIS: 150
OD_AXIS: 033
OD_SPHERE: -1.00
OS_SPHERE: -1.00
OS_CYLINDER: -1.75

## 2025-01-03 ASSESSMENT — KERATOMETRY
OD_AXISANGLE_DEGREES: 116
OS_K1POWER_DIOPTERS: 43.49
OD_K1POWER_DIOPTERS: 43.44
OS_AXISANGLE_DEGREES: 070
OD_K2POWER_DIOPTERS: 44.35
OS_K2POWER_DIOPTERS: 44.41

## 2025-01-03 ASSESSMENT — VISUAL ACUITY
OD_BCVA: 20/40-2
OS_BCVA: 20/80

## 2025-01-03 ASSESSMENT — CONFRONTATIONAL VISUAL FIELD TEST (CVF)
OS_FINDINGS: FULL
OD_FINDINGS: FULL

## 2025-01-03 ASSESSMENT — CORNEAL EDEMA CLINICAL DESCRIPTION: OD_CORNEALEDEMA: ABSENT

## 2025-05-20 ENCOUNTER — OFFICE (OUTPATIENT)
Dept: URBAN - METROPOLITAN AREA CLINIC 104 | Facility: CLINIC | Age: 75
Setting detail: OPHTHALMOLOGY
End: 2025-05-20
Payer: MEDICARE

## 2025-05-20 DIAGNOSIS — H17.9: ICD-10-CM

## 2025-05-20 DIAGNOSIS — H43.813: ICD-10-CM

## 2025-05-20 DIAGNOSIS — H40.1131: ICD-10-CM

## 2025-05-20 PROCEDURE — 92133 CPTRZD OPH DX IMG PST SGM ON: CPT | Performed by: OPTOMETRIST

## 2025-05-20 PROCEDURE — 99213 OFFICE O/P EST LOW 20 MIN: CPT | Performed by: OPTOMETRIST

## 2025-05-20 ASSESSMENT — CONFRONTATIONAL VISUAL FIELD TEST (CVF)
OD_FINDINGS: FULL
OS_FINDINGS: FULL

## 2025-05-20 ASSESSMENT — REFRACTION_CURRENTRX
OS_SPHERE: -0.75
OD_OVR_VA: 20/
OS_CYLINDER: -1.25
OD_VPRISM_DIRECTION: SV
OD_SPHERE: -2.00
OS_VPRISM_DIRECTION: SV
OS_AXIS: 133
OS_OVR_VA: 20/
OD_CYLINDER: SPH

## 2025-05-20 ASSESSMENT — REFRACTION_AUTOREFRACTION
OD_CYLINDER: -0.75
OD_SPHERE: -2.00
OS_SPHERE: -0.50
OS_CYLINDER: -3.00
OS_AXIS: 147
OD_AXIS: 057

## 2025-05-20 ASSESSMENT — VISUAL ACUITY
OS_BCVA: 20/20-1
OD_BCVA: 20/20-2

## 2025-05-20 ASSESSMENT — KERATOMETRY
OD_K1POWER_DIOPTERS: 43.44
OS_AXISANGLE_DEGREES: 070
OS_K2POWER_DIOPTERS: 44.41
OD_AXISANGLE_DEGREES: 116
OS_K1POWER_DIOPTERS: 43.49
OD_K2POWER_DIOPTERS: 44.35

## 2025-05-20 ASSESSMENT — REFRACTION_MANIFEST
OS_SPHERE: -0.75
OS_AXIS: 135
OD_SPHERE: -2.00
OS_CYLINDER: -1.25
OS_VA1: 20/25+2
OD_VA1: 20/20
OD_CYLINDER: SPH
OS_ADD: +2.50
OD_ADD: +2.50

## 2025-05-20 ASSESSMENT — CORNEAL SURGICAL SCARRING
OS_SCARRING: CENTRAL
OD_SCARRING: CENTRAL